# Patient Record
(demographics unavailable — no encounter records)

---

## 2025-02-10 NOTE — PHYSICAL EXAM
[de-identified] : Physical Exam   Gen: NAD Resp: Nonlabored Cards: WWP   Left Shoulder   Skin intact, no lesions Normal deltoid contour   TTP around ACJ NTTP  GT/rotator cuff insertion, biceps, coracoid, posterior scapula  No pain with neck ROM (full ROM)  FF 90 ER arm at side 70 IR to L5 No limitations in passive ROM, some pain over AC Negative drop arm test Negative Speeds 5/5 rotator cuff strength WWP No numbness [de-identified] :  3 views of the left shoulder were obtained at [place] [facility] and reviewed in clinic with independent interpretation showing a grade 2 AC joint sprain, no fractures

## 2025-02-10 NOTE — HISTORY OF PRESENT ILLNESS
[Right] : right hand dominant [FreeTextEntry1] : DAVID: fall in snow DOI: 2 weeks ago   Left shoulder pain since the fall onto left outstretched arm. Works at [employer] in [place]. No prior shoulder issues.  Went to [place] [facility] where x-rays were negative for fracture.  Has been slowly improving specific treatment.  No numbness or tingling.

## 2025-02-10 NOTE — ASSESSMENT
[FreeTextEntry1] : 30-year-old female with a grade 2 AC joint sprain    -Discussed diagnosis, natural history of condition, and treatment options -PT prescription to work on shoulder range of motion -Sling provided for comfort but encouraged to come out of the sling and left possible return in 4 to 6 weeks if no improvement discussed risks of AC joint prominence of the right arm expect this will -All questions answered, patient in agreement

## 2025-06-27 NOTE — DISCUSSION/SUMMARY
[FreeTextEntry1] : RESULTS TRANSMISSION Josephine Larkin is a 30-year-old female who was called on 06/17/2025 for a discussion regarding his genetic testing results related to hereditary cancer predisposition.   Ms. Larkin was originally seen at [facility] on 05/28/2025 for hereditary cancer predisposition risk assessment due to family history of cancer. Ms. Larkin decided to pursue genetic testing using [facility]'s panel (colorectal, brain tumors (most common syndromes; not brain cancers) breast and gynecological cancer; 33 genes.   TEST RESULTS: NEGATIVE  No pathogenic (disease-causing) variants or VUSs were detected in the following genes: APC, CAMERON, AXIN2, BARD1, BMPR1A, BRCA1, BRCA2, BRIP1, CDH1, CHEK2, EPCAM, GREM1, MLH1, MSH2, MSH3, MSH6, MUTYH, NF1, NF2, NTHL1, PALB2, PMS2, POLD1, POLE, PTCH1, PTEN, RAD51C, RAD51D, SMAD4, STK11, SUFU, TP53, VHL.  RESULTS INTERPRETATION AND ASSESSMENT: Given Ms. Larkin's personal and current reported family history of cancer, and her negative genetic test results, the following screening guidelines and risk-reducing recommendations were discussed:  BREAST:  -	Given Ms. Larkin's family history of breast cancer, an COLIN risk evaluation, v8 was conducted utilizing family history and reproductive factors. An updated 0.8-1.0% 10-year risk and 19.0-22.7% remaining lifetime risk of breast cancer was estimated compared to the general population risk of 0.5% and 11.2%, respectively.  -	We discussed that her risk is primarily moderately elevated due to reproductive factors, and it would be ideal to reassess this risk at age 35 for Ms. Hewitt (i.e. 10 years prior to her mother's breast cancer diagnosis at age 46) to assess for the need of high-risk screening. -	In the absence of other indications, Ms. Hewitt should practice age-appropriate breast cancer screening as recommended for the general population, until this reassessment at age 35.  OTHER:  In the absence of other indications, Ms. Larkin should practice age-appropriate cancer screening of other organ systems as recommended for the general population.  We also discussed that, while no cause of the patient's personal and family history of cancer was identified, this result, while reassuring, does entirely not rule out a hereditary cancer risk in the patient. It is possible, although unlikely, the patient has a mutation in one of the genes tested that is not detectable by this analysis, or has a mutation in a different gene, either known or unknown. It is also possible there is a hereditary cancer predisposition in the family, but the patient did not inherit it.  We informed Ms. Larkin that our knowledge of genetics and inherited cancer conditions is changing rapidly. Therefore, we recommended that Ms. Larkin contact our office, every 2 to 3 years, to discuss relevant advances in cancer genetics.  We emphasized the importance of re-contacting us with updates regarding her personal and family history of cancer as well as any updates regarding additional cancer genetic test results performed for the patient and/or family members.  Such updates could possibly change our risk assessment and recommendations.   In addition, we discussed Ms. Larkin's mother could consider pursuing cancer risk assessment genetic counseling with the option of genetic testing.   PLAN: 1. See above for recommended screening and risk-reduction strategies. 2. Patient informed consult note will be available through their [facility] patient portal and genetic test results will be released via [facility] portal.  3. Ms. Larkin was encouraged to contact us every 2-3 years to discuss relevant advances in cancer genetics, or sooner if there are any changes in her personal or family history of cancer.  For any additional questions please call [facility] at [phone].   Tanja Tapia MS, [facility], [facility]  CC:  Patient Dr. Naty Marie.